# Patient Record
Sex: MALE | Race: WHITE | Employment: FULL TIME | URBAN - METROPOLITAN AREA
[De-identification: names, ages, dates, MRNs, and addresses within clinical notes are randomized per-mention and may not be internally consistent; named-entity substitution may affect disease eponyms.]

---

## 2019-03-27 PROBLEM — M75.42 IMPINGEMENT SYNDROME OF LEFT SHOULDER: Status: ACTIVE | Noted: 2019-03-27

## 2019-05-01 PROBLEM — M75.32 CALCIFIC TENDINITIS OF LEFT SHOULDER: Status: ACTIVE | Noted: 2019-05-01

## 2019-05-31 PROBLEM — Z86.010 PERSONAL HISTORY OF COLONIC POLYPS: Status: ACTIVE | Noted: 2019-05-31

## 2019-05-31 PROBLEM — Z86.0100 PERSONAL HISTORY OF COLONIC POLYPS: Status: ACTIVE | Noted: 2019-05-31

## 2020-12-28 ENCOUNTER — OFFICE VISIT (OUTPATIENT)
Dept: SURGERY | Facility: CLINIC | Age: 35
End: 2020-12-28
Payer: COMMERCIAL

## 2020-12-28 DIAGNOSIS — Z80.42 FAMILY HISTORY OF PROSTATE CANCER: Primary | ICD-10-CM

## 2020-12-28 DIAGNOSIS — Z30.09 STERILIZATION CONSULT: ICD-10-CM

## 2020-12-28 PROCEDURE — 99203 OFFICE O/P NEW LOW 30 MIN: CPT | Performed by: UROLOGY

## 2020-12-28 NOTE — PROGRESS NOTES
Rooming Clinician:     Yajaira Snyder is a 28year old male. Patient presents with:  Consult: vasectomy discussion        HPI:     Patient is here for vasectomy discussion.   He has a family history with prostate cancer with father's brother and an un tenderness, or hernia  : The penis is circumcised. Urethral meatus is patent without discharge. The testicles are descended bilaterally and are normal shape and size. The prostate exam is normal and benign.   Normal vas deferens bilaterally  NEURO: neel I instructed the patient that he should continue using some form of birth control until semen sample was obtained after the procedure. All the patient's questions were answered.       Diagnoses and all orders for this visit:    Family history of prostate c

## 2021-01-11 ENCOUNTER — TELEPHONE (OUTPATIENT)
Dept: SURGERY | Facility: CLINIC | Age: 36
End: 2021-01-11

## 2021-01-11 NOTE — TELEPHONE ENCOUNTER
RN called patient in response to his request to reschedule his 1/14 vasectomy to the following week. Patient agreeble to 1/22 9:30am with Dr Pastor Garcia. All questions answered.

## 2021-01-22 ENCOUNTER — PROCEDURE (OUTPATIENT)
Dept: SURGERY | Facility: CLINIC | Age: 36
End: 2021-01-22
Payer: COMMERCIAL

## 2021-01-22 DIAGNOSIS — Z30.2 ENCOUNTER FOR STERILIZATION: Primary | ICD-10-CM

## 2021-01-22 PROCEDURE — 55250 REMOVAL OF SPERM DUCT(S): CPT | Performed by: UROLOGY

## 2021-01-22 RX ORDER — HYDROCODONE BITARTRATE AND ACETAMINOPHEN 5; 325 MG/1; MG/1
1-2 TABLET ORAL EVERY 4 HOURS PRN
Qty: 20 TABLET | Refills: 0 | Status: SHIPPED | OUTPATIENT
Start: 2021-01-22

## 2021-01-22 NOTE — PROGRESS NOTES
The patient had been previously counseled and examined. The procedure of male sterilization was thoroughly discussed with the patient including alternatives, risks, benefits and complications and he understands and wishes to proceed.     The patient wa

## 2021-04-01 ENCOUNTER — TELEPHONE (OUTPATIENT)
Dept: SURGERY | Facility: CLINIC | Age: 36
End: 2021-04-01

## 2021-04-01 DIAGNOSIS — Z98.52 STATUS POST VASECTOMY: Primary | ICD-10-CM

## 2021-04-01 NOTE — TELEPHONE ENCOUNTER
Per pt lost paperwork for pos vas sperm test to drop off at the lab. Pt asking if it can be uploaded in his mychart.  Please advise

## 2021-04-01 NOTE — TELEPHONE ENCOUNTER
RN replied to this patient via "[employer]hart and sent post-vas instructions via mail. \"Per pt lost paperwork for pos vas sperm test to drop off at the lab. Pt asking if it can be uploaded in his mychart.  Please advise\"

## 2021-04-02 ENCOUNTER — TELEPHONE (OUTPATIENT)
Dept: SURGERY | Facility: CLINIC | Age: 36
End: 2021-04-02

## 2021-04-02 ENCOUNTER — LAB ENCOUNTER (OUTPATIENT)
Dept: LAB | Facility: HOSPITAL | Age: 36
End: 2021-04-02
Attending: UROLOGY
Payer: COMMERCIAL

## 2021-04-02 DIAGNOSIS — Z98.52 STATUS POST VASECTOMY: ICD-10-CM

## 2021-04-02 PROCEDURE — 89310 SEMEN ANALYSIS W/COUNT: CPT

## 2021-04-02 NOTE — TELEPHONE ENCOUNTER
Per  Sina Romberg, pt tried to drop off semen sample, but no order has been placed. Fax: [phone].  Please advise

## 2021-04-02 NOTE — TELEPHONE ENCOUNTER
RN called and spoke Branden Castrejon to inform that SA order is already placed. She confirmed receiving/viewing the order. All questions answered.

## 2021-04-02 NOTE — TELEPHONE ENCOUNTER
[facility] called. Have not received the order. Pt will be in shortly with sample. Please send order.

## 2022-06-12 ENCOUNTER — APPOINTMENT (OUTPATIENT)
Dept: GENERAL RADIOLOGY | Facility: HOSPITAL | Age: 37
End: 2022-06-12
Attending: EMERGENCY MEDICINE
Payer: COMMERCIAL

## 2022-06-12 ENCOUNTER — HOSPITAL ENCOUNTER (EMERGENCY)
Facility: HOSPITAL | Age: 37
Discharge: HOME | End: 2022-06-12
Attending: EMERGENCY MEDICINE
Payer: COMMERCIAL

## 2022-06-12 VITALS
SYSTOLIC BLOOD PRESSURE: 137 MMHG | HEART RATE: 63 BPM | DIASTOLIC BLOOD PRESSURE: 93 MMHG | OXYGEN SATURATION: 97 % | TEMPERATURE: 97 F | RESPIRATION RATE: 18 BRPM | BODY MASS INDEX: 29 KG/M2 | WEIGHT: 195 LBS

## 2022-06-12 DIAGNOSIS — M79.89 SWELLING OF FINGER: Primary | ICD-10-CM

## 2022-06-12 PROCEDURE — 99283 EMERGENCY DEPT VISIT LOW MDM: CPT

## 2022-06-12 PROCEDURE — 73140 X-RAY EXAM OF FINGER(S): CPT | Performed by: EMERGENCY MEDICINE

## 2022-06-12 PROCEDURE — 29130 APPL FINGER SPLINT STATIC: CPT

## 2022-06-12 RX ORDER — AMOXICILLIN AND CLAVULANATE POTASSIUM 875; 125 MG/1; MG/1
1 TABLET, FILM COATED ORAL 2 TIMES DAILY
Qty: 14 TABLET | Refills: 0 | Status: SHIPPED | OUTPATIENT
Start: 2022-06-12 | End: 2022-06-19

## 2022-06-13 NOTE — ED INITIAL ASSESSMENT (HPI)
A/O x 4. Patient presents with right 5th finger pain since this morning.   Patient denies any trauma/injury, just states that he woke up with it swollen

## 2025-05-29 ENCOUNTER — APPOINTMENT (OUTPATIENT)
Dept: CT IMAGING | Facility: HOSPITAL | Age: 40
End: 2025-05-29
Payer: COMMERCIAL

## 2025-05-29 ENCOUNTER — HOSPITAL ENCOUNTER (EMERGENCY)
Facility: HOSPITAL | Age: 40
Discharge: HOME | End: 2025-05-29
Attending: EMERGENCY MEDICINE
Payer: COMMERCIAL

## 2025-05-29 VITALS
DIASTOLIC BLOOD PRESSURE: 78 MMHG | HEIGHT: 69 IN | OXYGEN SATURATION: 98 % | BODY MASS INDEX: 28.88 KG/M2 | WEIGHT: 195 LBS | TEMPERATURE: 97 F | HEART RATE: 89 BPM | RESPIRATION RATE: 16 BRPM | SYSTOLIC BLOOD PRESSURE: 123 MMHG

## 2025-05-29 DIAGNOSIS — S06.0X0A CONCUSSION WITHOUT LOSS OF CONSCIOUSNESS, INITIAL ENCOUNTER: ICD-10-CM

## 2025-05-29 DIAGNOSIS — S09.90XA CLOSED HEAD INJURY, INITIAL ENCOUNTER: Primary | ICD-10-CM

## 2025-05-29 PROCEDURE — 70450 CT HEAD/BRAIN W/O DYE: CPT

## 2025-05-29 PROCEDURE — 99284 EMERGENCY DEPT VISIT MOD MDM: CPT

## 2025-05-29 PROCEDURE — 99283 EMERGENCY DEPT VISIT LOW MDM: CPT

## 2025-05-30 NOTE — DISCHARGE INSTRUCTIONS
Neurologic Instructions    Call your doctor if you have:  increased pain or headache.   trouble seeing, walking or using your arms or legs.   dizziness or passing out.   any change in behavior (agitated or sleepy).   trouble being awakened from sleep.   numbness in your face, arm or leg.   extreme weakness.   trouble talking.   nausea and vomiting.   any new or severe symptoms.    Take your medicines as prescribed. Most important, see a doctor again as discussed. If you have problems that we have not discussed, call or visit your doctor right away. If you cannot reach your doctor, return to the Emergency Department.   
Statement Selected

## 2025-05-30 NOTE — ED PROVIDER NOTES
Patient Seen in: [facility] Emergency Department        History  Chief Complaint   Patient presents with    Head Injury     Stated Complaint: hit head on turf during soccer, having blurred vision, nausea and headache    Subjective:   HPI            Flipped while playing indoor soccer and his head on the turf.  Having some blurred vision vague nausea and pain at the site of impact.  No neck or back pain.  No LOC.  Not on any anticoagulants or antiplatelet agents.      Objective:     Past Medical History:    Blood in the stool    Pain in joints    Wears glasses              Past Surgical History:   Procedure Laterality Date    Colonoscopy      [place] teeth removed                  Social History     Socioeconomic History    Marital status:    Tobacco Use    Smoking status: Never    Smokeless tobacco: Never   Substance and Sexual Activity    Alcohol use: Yes     Comment: OCCASIONAL    Drug use: Never                                Physical Exam    ED Triage Vitals [05/29/25 2120]   /78   Pulse 89   Resp 16   Temp 97.4 °F (36.3 °C)   Temp src Temporal   SpO2 98 %   O2 Device None (Room air)       Current Vitals:   Vital Signs  BP: 123/78  Pulse: 89  Resp: 16  Temp: 97.4 °F (36.3 °C)  Temp src: Temporal    Oxygen Therapy  SpO2: 98 %  O2 Device: None (Room air)              Physical Exam      Physical Exam   Constitutional: Awake, alert, well appearing  Head: Normocephalic and atraumatic.   Eyes: Conjunctivae are normal. Pupils are equal, round, and reactive to light.   Neck: Normal range of motion. No JVD  Cardiovascular: Normal rate, regular rhythm  Pulmonary/Chest: Normal effort.  No accessory muscle use.  No cyanosis.  Abdominal: Soft. Not distended.  Neurological: Pt is alert and oriented to person, place, and time. no cranial nerve deficits. Speech fluent  No midline cervical spine tenderness    Extraocular motions full and painless no visual field cuts no blurry vision steady gait        Normal  speech        ED Course  Labs Reviewed - No data to display         CT BRAIN OR HEAD (CPT=70450)  Result Date: 5/29/2025  CONCLUSION:  No acute intracranial process.    LOCATION:  [facility]   Dictated by (CST): Alexis Kaplan MD on 5/29/2025 at 9:41 PM     Finalized by (CST): Alexis Kaplan MD on 5/29/2025 at 9:44 PM                         MDM       Life-threatening intracranial hemorrhage, skull fracture, concussion all considered    -Likely concussion    -Supportive care    -Advil Tylenol as needed  - Discussed reasons to return    -Ophthalmology follow-up for any ongoing or delayed onset vision issues.        Medical Decision Making      Disposition and Plan     Clinical Impression:  1. Closed head injury, initial encounter    2. Concussion without loss of consciousness, initial encounter         Disposition:  Discharge  5/29/2025 10:23 pm    Follow-up:  [address]  [address] [address] [phone]  Call  choose option 1 for general neurology    Anjel Bowen,   [address]  [address]  [facility] [id number] [phone]    Follow up            Medications Prescribed:  Discharge Medication List as of 5/29/2025 10:28 PM                Supplementary Documentation:

## 2025-05-30 NOTE — ED INITIAL ASSESSMENT (HPI)
Patient here with c/o fall while playing soccer on indoor turf.  Patient reports he slipped backwards striking the back of his head and reports since fall he has had some nausea, headache, and intermittent blurry vision.  No LOC

## 2025-06-03 ENCOUNTER — OFFICE VISIT (OUTPATIENT)
Dept: NEUROLOGY | Facility: CLINIC | Age: 40
End: 2025-06-03
Payer: COMMERCIAL

## 2025-06-03 VITALS
DIASTOLIC BLOOD PRESSURE: 70 MMHG | SYSTOLIC BLOOD PRESSURE: 110 MMHG | WEIGHT: 195 LBS | RESPIRATION RATE: 16 BRPM | HEART RATE: 64 BPM | BODY MASS INDEX: 29 KG/M2

## 2025-06-03 DIAGNOSIS — S09.90XA INJURY OF HEAD, INITIAL ENCOUNTER: Primary | ICD-10-CM

## 2025-06-03 PROCEDURE — 99204 OFFICE O/P NEW MOD 45 MIN: CPT | Performed by: OTHER

## 2025-06-03 RX ORDER — FEXOFENADINE HCL AND PSEUDOEPHEDRINE HCL 180; 240 MG/1; MG/1
1 TABLET, EXTENDED RELEASE ORAL DAILY
COMMUNITY

## 2025-06-03 NOTE — PROGRESS NOTES
Neurology History & Physical     ASSESSMENT & PLAN:      ICD-10-CM    1. Injury of head, initial encounter  S09.90XA         Very mild head injury, a very minor concussion, completely resolved.  Advised to contact me if symptoms return.  I advised he may return to sports in 2 weeks.  We discussed risk of second head injury misael within short interval, could have more severe consequence, though he is doing quite well.    He does see optometry in 1 month (his annual visit).  I don't think he needs ophtho eval since symptoms have resolved.    Planning to see PCP (Dr. Armstrong) in Aug 2025.    Return for concerns as needed.       ~~~~~~~~~~~~~~~~~~~~~~~~~~~    CHIEF COMPLAINT / REASON FOR VISIT:    Chief Complaint   Patient presents with    Neurologic Problem     New patient. Hospital 5/29/25, patient fell and hit his head playing soccer. Hasn't had any symptoms since Sunday. Was feeling headaches, blurred vision, and cloudy head.      HISTORY OBTAINED FROM:  Patient and others as above  Chart review    HISTORY:  Monico Day is a 39 year old male with a head injury last week (5/29/25).  He was playing soccer and fell backward, hit the back of his head.  No LOC and was able to stand up immediately.  Felt crossed eyed, dizzy, nauseated.  Went to ER, Head CT unremarkable.  Next day he felt cloudy and had mild headache, had slight difficulty sleeping just that night.  By 3rd day headache was very slight.  By 4th day felt that all symptoms had resolved.  Has not taken any meds since afternoon of 2nd day.      Is an  - feels no impact on work  Driving status:  Driving since Friday    DATA REVIEWED:  As documented in the history    May 2025  Head CT unremarkable  ER note unremarkable   PSA unremarkable     PHYSICAL EXAMINATION:  /70   Pulse 64   Resp 16   Wt 195 lb (88.5 kg)   BMI 28.80 kg/m²     Gen: in NAD  MSE: AAOx3, nl language, nl attn/conc, nl fund of knowledge  CN: PERRL, VFF; EOMI, no nystagmus; nl  facial mvmt bilaterally; nl hearing bilaterally; nl palate elevation bilaterally; nl voice; nl shoulder shrug b/I; nl tongue movement  Motor: 5/5 x4; no drift; normal tone; no abnormal movements  Sensory: nl vibration x4  Coord: nl FTN b/I  Reflex: 2+ BUE and BLE  Gait: normal, unremarkable romberg    Allergies   Allergen Reactions    Azithromycin RASH     Rash on leg from 8-9 years ago       Current medications:  Current Outpatient Medications on File Prior to Visit   Medication Sig Dispense Refill    Fexofenadine-Pseudoephed -240 MG Oral Tablet 24 Hr Take 1 tablet by mouth daily.       No current facility-administered medications on file prior to visit.        Past Medical History:    Blood in the stool    Pain in joints    Wears glasses       Past Surgical History:   Procedure Laterality Date    Colonoscopy      [place] teeth removed         Social History     Socioeconomic History    Marital status:    Tobacco Use    Smoking status: Never    Smokeless tobacco: Never   Vaping Use    Vaping status: Never Used   Substance and Sexual Activity    Alcohol use: Yes     Comment: OCCASIONAL    Drug use: Never   Other Topics Concern    Caffeine Concern Yes     Comment: 1 cup a day    Exercise Yes     Comment: soccer       Family History   Problem Relation Age of Onset    Prostate Cancer Father     Other (Other) Father         LYMPHOMA    Colon Polyps Mother     Diabetes Mother     Colon Polyps Paternal Grandmother     Diabetes Paternal Grandmother     Prostate Cancer Paternal Grandfather     Other (Other) Sister         MULTIPLE SCLEROSIS    Prostate Cancer Paternal Uncle        Cyndie Ramos MD, FAES, FAAN  Board-Certified in Neurology, Epilepsy, and Clinical Neurophysiology  [facility] [place]

## 2025-06-03 NOTE — PATIENT INSTRUCTIONS
Refill policies:     Contact your pharmacy at least 5 days prior to running out of medication and have them send an electronic request or submit request through the “request refill” option in your [employer] account.  Allow 3-5 business days for refills; controlled substances may take longer.  If your prescription is due for a refill, please make sure you have a follow up appointment scheduled with the appropriate prescribing physician.  To best provide you care, patients receiving routine medications need to be seen at least once a year.  Patients receiving narcotic/controlled substance medications need to be seen at least once every 3 months.  Patients receiving narcotic/controlled substance medications will be required to sign an Opioid Treatment Agreement/Contract.  Refills will not be refilled on weekends or holidays; on-call physicians will not refill routine medications.  No narcotics or controlled substances are refilled after noon on Fridays or by on-call physicians.  Federal Law states narcotics must be electronically prescribed.  A 30-day supply with no refills is the maximum allowed by law.  In the event that your preferred pharmacy does not have the requested medication in stock (e.g., Backordered), it is your responsibility to find another pharmacy that has the requested medication available.  We will gladly send a new prescription to that pharmacy at your request.

## 2025-07-31 PROBLEM — M75.42 IMPINGEMENT SYNDROME OF LEFT SHOULDER: Status: RESOLVED | Noted: 2019-03-27 | Resolved: 2025-07-31

## 2025-07-31 PROBLEM — M75.32 CALCIFIC TENDINITIS OF LEFT SHOULDER: Status: RESOLVED | Noted: 2019-05-01 | Resolved: 2025-07-31

## 2025-08-01 ENCOUNTER — OFFICE VISIT (OUTPATIENT)
Dept: INTERNAL MEDICINE CLINIC | Facility: CLINIC | Age: 40
End: 2025-08-01

## 2025-08-01 VITALS
BODY MASS INDEX: 28.77 KG/M2 | RESPIRATION RATE: 14 BRPM | DIASTOLIC BLOOD PRESSURE: 80 MMHG | TEMPERATURE: 97 F | HEART RATE: 77 BPM | SYSTOLIC BLOOD PRESSURE: 120 MMHG | WEIGHT: 194.25 LBS | HEIGHT: 69 IN | OXYGEN SATURATION: 97 %

## 2025-08-01 DIAGNOSIS — Z80.42 FAMILY HISTORY OF PROSTATE CANCER IN FATHER: ICD-10-CM

## 2025-08-01 DIAGNOSIS — Z00.00 ANNUAL PHYSICAL EXAM: Primary | ICD-10-CM

## 2025-08-01 DIAGNOSIS — Z23 NEED FOR DIPHTHERIA-TETANUS-PERTUSSIS (TDAP) VACCINE: ICD-10-CM

## 2025-08-01 DIAGNOSIS — Z00.00 ROUTINE GENERAL MEDICAL EXAMINATION AT A HEALTH CARE FACILITY: ICD-10-CM

## 2025-08-01 PROCEDURE — 90715 TDAP VACCINE 7 YRS/> IM: CPT | Performed by: INTERNAL MEDICINE

## 2025-08-01 PROCEDURE — 99385 PREV VISIT NEW AGE 18-39: CPT | Performed by: INTERNAL MEDICINE

## 2025-08-01 PROCEDURE — 90471 IMMUNIZATION ADMIN: CPT | Performed by: INTERNAL MEDICINE

## (undated) NOTE — LETTER
Dear colleague,    Thank you very much for the opportunity to see your patient.  Below, please find information from my consult for your patient's recent visit.    I appreciate the chance to take care of your patient with you.  Please feel free to call me with any questions or concerns.    ASSESSMENT & PLAN:      ICD-10-CM    1. Injury of head, initial encounter  S09.90XA         Very mild head injury, a very minor concussion, completely resolved.  Advised to contact me if symptoms return.  I advised he may return to sports in 2 weeks.  We discussed risk of second head injury misael within short interval, could have more severe consequence, though he is doing quite well.    He does see optometry in 1 month (his annual visit).  I don't think he needs ophtho eval since symptoms have resolved.    Planning to see PCP (Dr. Armstrong) in Aug 2025.    Return for concerns as needed.           Sincerely,        Cyndie Ramos MD